# Patient Record
Sex: FEMALE | Race: WHITE | NOT HISPANIC OR LATINO | Employment: STUDENT | URBAN - METROPOLITAN AREA
[De-identification: names, ages, dates, MRNs, and addresses within clinical notes are randomized per-mention and may not be internally consistent; named-entity substitution may affect disease eponyms.]

---

## 2023-04-04 ENCOUNTER — APPOINTMENT (OUTPATIENT)
Dept: PRIMARY CARE | Facility: CLINIC | Age: 18
End: 2023-04-04
Payer: COMMERCIAL

## 2023-04-04 PROBLEM — L72.9 INFECTED CYST OF SKIN: Status: ACTIVE | Noted: 2023-04-04
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
## 2023-04-04 PROBLEM — L08.9 INFECTED CYST OF SKIN: Status: ACTIVE | Noted: 2023-04-04

## 2023-04-04 PROBLEM — D17.20 LIPOMA OF THIGH: Status: ACTIVE | Noted: 2023-04-04

## 2023-04-04 PROBLEM — S93.601A FOOT SPRAIN, RIGHT, INITIAL ENCOUNTER: Status: ACTIVE | Noted: 2023-04-04

## 2023-04-05 ENCOUNTER — APPOINTMENT (OUTPATIENT)
Dept: PRIMARY CARE | Facility: CLINIC | Age: 18
End: 2023-04-05
Payer: COMMERCIAL

## 2025-02-16 ENCOUNTER — APPOINTMENT (OUTPATIENT)
Dept: RADIOLOGY | Facility: HOSPITAL | Age: 20
End: 2025-02-16
Payer: COMMERCIAL

## 2025-02-16 ENCOUNTER — HOSPITAL ENCOUNTER (EMERGENCY)
Facility: HOSPITAL | Age: 20
Discharge: HOME | End: 2025-02-17
Payer: COMMERCIAL

## 2025-02-16 DIAGNOSIS — R10.9 ABDOMINAL PAIN, UNSPECIFIED ABDOMINAL LOCATION: Primary | ICD-10-CM

## 2025-02-16 LAB
ALBUMIN SERPL BCP-MCNC: 4.5 G/DL (ref 3.4–5)
ALP SERPL-CCNC: 44 U/L (ref 33–110)
ALT SERPL W P-5'-P-CCNC: 17 U/L (ref 7–45)
ANION GAP SERPL CALC-SCNC: 11 MMOL/L (ref 10–20)
APPEARANCE UR: ABNORMAL
AST SERPL W P-5'-P-CCNC: 17 U/L (ref 9–39)
B-HCG SERPL-ACNC: <2 MIU/ML
BASOPHILS # BLD AUTO: 0.06 X10*3/UL (ref 0–0.1)
BASOPHILS NFR BLD AUTO: 0.6 %
BILIRUB SERPL-MCNC: 0.3 MG/DL (ref 0–1.2)
BILIRUB UR STRIP.AUTO-MCNC: NEGATIVE MG/DL
BUN SERPL-MCNC: 16 MG/DL (ref 6–23)
CALCIUM SERPL-MCNC: 9.1 MG/DL (ref 8.6–10.3)
CHLORIDE SERPL-SCNC: 103 MMOL/L (ref 98–107)
CO2 SERPL-SCNC: 27 MMOL/L (ref 21–32)
COLOR UR: ABNORMAL
CREAT SERPL-MCNC: 0.78 MG/DL (ref 0.5–1.05)
EGFRCR SERPLBLD CKD-EPI 2021: >90 ML/MIN/1.73M*2
EOSINOPHIL # BLD AUTO: 0.36 X10*3/UL (ref 0–0.7)
EOSINOPHIL NFR BLD AUTO: 3.7 %
ERYTHROCYTE [DISTWIDTH] IN BLOOD BY AUTOMATED COUNT: 13.2 % (ref 11.5–14.5)
GLUCOSE SERPL-MCNC: 88 MG/DL (ref 74–99)
GLUCOSE UR STRIP.AUTO-MCNC: NORMAL MG/DL
HCT VFR BLD AUTO: 45 % (ref 36–46)
HGB BLD-MCNC: 15.1 G/DL (ref 12–16)
IMM GRANULOCYTES # BLD AUTO: 0.03 X10*3/UL (ref 0–0.7)
IMM GRANULOCYTES NFR BLD AUTO: 0.3 % (ref 0–0.9)
KETONES UR STRIP.AUTO-MCNC: NEGATIVE MG/DL
LACTATE SERPL-SCNC: 1 MMOL/L (ref 0.4–2)
LEUKOCYTE ESTERASE UR QL STRIP.AUTO: NEGATIVE
LIPASE SERPL-CCNC: 34 U/L (ref 9–82)
LYMPHOCYTES # BLD AUTO: 2.78 X10*3/UL (ref 1.2–4.8)
LYMPHOCYTES NFR BLD AUTO: 28.6 %
MCH RBC QN AUTO: 29.9 PG (ref 26–34)
MCHC RBC AUTO-ENTMCNC: 33.6 G/DL (ref 32–36)
MCV RBC AUTO: 89 FL (ref 80–100)
MONOCYTES # BLD AUTO: 1 X10*3/UL (ref 0.1–1)
MONOCYTES NFR BLD AUTO: 10.3 %
NEUTROPHILS # BLD AUTO: 5.48 X10*3/UL (ref 1.2–7.7)
NEUTROPHILS NFR BLD AUTO: 56.5 %
NITRITE UR QL STRIP.AUTO: NEGATIVE
NRBC BLD-RTO: 0 /100 WBCS (ref 0–0)
PH UR STRIP.AUTO: 7 [PH]
PLATELET # BLD AUTO: 259 X10*3/UL (ref 150–450)
POTASSIUM SERPL-SCNC: 4 MMOL/L (ref 3.5–5.3)
PROT SERPL-MCNC: 7.1 G/DL (ref 6.4–8.2)
PROT UR STRIP.AUTO-MCNC: NEGATIVE MG/DL
RBC # BLD AUTO: 5.05 X10*6/UL (ref 4–5.2)
RBC # UR STRIP.AUTO: NEGATIVE MG/DL
SODIUM SERPL-SCNC: 137 MMOL/L (ref 136–145)
SP GR UR STRIP.AUTO: 1.02
UROBILINOGEN UR STRIP.AUTO-MCNC: ABNORMAL MG/DL
WBC # BLD AUTO: 9.7 X10*3/UL (ref 4.4–11.3)

## 2025-02-16 PROCEDURE — 83605 ASSAY OF LACTIC ACID: CPT | Performed by: STUDENT IN AN ORGANIZED HEALTH CARE EDUCATION/TRAINING PROGRAM

## 2025-02-16 PROCEDURE — 36415 COLL VENOUS BLD VENIPUNCTURE: CPT | Performed by: STUDENT IN AN ORGANIZED HEALTH CARE EDUCATION/TRAINING PROGRAM

## 2025-02-16 PROCEDURE — 85025 COMPLETE CBC W/AUTO DIFF WBC: CPT | Performed by: STUDENT IN AN ORGANIZED HEALTH CARE EDUCATION/TRAINING PROGRAM

## 2025-02-16 PROCEDURE — 99285 EMERGENCY DEPT VISIT HI MDM: CPT | Mod: 25

## 2025-02-16 PROCEDURE — 2550000001 HC RX 255 CONTRASTS: Performed by: PHYSICIAN ASSISTANT

## 2025-02-16 PROCEDURE — 74177 CT ABD & PELVIS W/CONTRAST: CPT | Mod: FOREIGN READ | Performed by: RADIOLOGY

## 2025-02-16 PROCEDURE — 96374 THER/PROPH/DIAG INJ IV PUSH: CPT

## 2025-02-16 PROCEDURE — 83690 ASSAY OF LIPASE: CPT | Performed by: STUDENT IN AN ORGANIZED HEALTH CARE EDUCATION/TRAINING PROGRAM

## 2025-02-16 PROCEDURE — 74177 CT ABD & PELVIS W/CONTRAST: CPT

## 2025-02-16 PROCEDURE — 80053 COMPREHEN METABOLIC PANEL: CPT | Performed by: STUDENT IN AN ORGANIZED HEALTH CARE EDUCATION/TRAINING PROGRAM

## 2025-02-16 PROCEDURE — 84702 CHORIONIC GONADOTROPIN TEST: CPT | Performed by: PHYSICIAN ASSISTANT

## 2025-02-16 PROCEDURE — 81003 URINALYSIS AUTO W/O SCOPE: CPT | Performed by: STUDENT IN AN ORGANIZED HEALTH CARE EDUCATION/TRAINING PROGRAM

## 2025-02-16 PROCEDURE — 2500000004 HC RX 250 GENERAL PHARMACY W/ HCPCS (ALT 636 FOR OP/ED): Performed by: PHYSICIAN ASSISTANT

## 2025-02-16 PROCEDURE — 96375 TX/PRO/DX INJ NEW DRUG ADDON: CPT

## 2025-02-16 RX ORDER — MORPHINE SULFATE 4 MG/ML
4 INJECTION INTRAVENOUS ONCE
Status: COMPLETED | OUTPATIENT
Start: 2025-02-16 | End: 2025-02-16

## 2025-02-16 RX ORDER — ONDANSETRON HYDROCHLORIDE 2 MG/ML
4 INJECTION, SOLUTION INTRAVENOUS ONCE
Status: COMPLETED | OUTPATIENT
Start: 2025-02-16 | End: 2025-02-16

## 2025-02-16 RX ADMIN — ONDANSETRON 4 MG: 2 INJECTION INTRAMUSCULAR; INTRAVENOUS at 23:56

## 2025-02-16 RX ADMIN — MORPHINE SULFATE 4 MG: 4 INJECTION, SOLUTION INTRAMUSCULAR; INTRAVENOUS at 23:56

## 2025-02-16 RX ADMIN — IOHEXOL 75 ML: 350 INJECTION, SOLUTION INTRAVENOUS at 23:35

## 2025-02-16 ASSESSMENT — PAIN DESCRIPTION - ORIENTATION
ORIENTATION: LOWER;RIGHT
ORIENTATION_2: RIGHT;MID

## 2025-02-16 ASSESSMENT — PAIN DESCRIPTION - PAIN TYPE: TYPE: ACUTE PAIN

## 2025-02-16 ASSESSMENT — PAIN SCALES - GENERAL
PAINLEVEL_OUTOF10: 8

## 2025-02-16 ASSESSMENT — COLUMBIA-SUICIDE SEVERITY RATING SCALE - C-SSRS
1. IN THE PAST MONTH, HAVE YOU WISHED YOU WERE DEAD OR WISHED YOU COULD GO TO SLEEP AND NOT WAKE UP?: NO
2. HAVE YOU ACTUALLY HAD ANY THOUGHTS OF KILLING YOURSELF?: NO

## 2025-02-16 ASSESSMENT — PAIN DESCRIPTION - LOCATION
LOCATION: BACK
LOCATION_2: ABDOMEN

## 2025-02-16 ASSESSMENT — PAIN DESCRIPTION - DESCRIPTORS
DESCRIPTORS: SHARP
DESCRIPTORS_2: SHARP;BURNING

## 2025-02-16 ASSESSMENT — PAIN - FUNCTIONAL ASSESSMENT: PAIN_FUNCTIONAL_ASSESSMENT: 0-10

## 2025-02-16 ASSESSMENT — PAIN DESCRIPTION - FREQUENCY: FREQUENCY: CONSTANT/CONTINUOUS

## 2025-02-17 VITALS
HEIGHT: 64 IN | WEIGHT: 165 LBS | RESPIRATION RATE: 18 BRPM | HEART RATE: 57 BPM | TEMPERATURE: 98.6 F | BODY MASS INDEX: 28.17 KG/M2 | OXYGEN SATURATION: 100 % | DIASTOLIC BLOOD PRESSURE: 81 MMHG | SYSTOLIC BLOOD PRESSURE: 119 MMHG

## 2025-02-17 LAB — HOLD SPECIMEN: NORMAL

## 2025-02-17 PROCEDURE — 96375 TX/PRO/DX INJ NEW DRUG ADDON: CPT

## 2025-02-17 PROCEDURE — 2500000004 HC RX 250 GENERAL PHARMACY W/ HCPCS (ALT 636 FOR OP/ED): Performed by: PHYSICIAN ASSISTANT

## 2025-02-17 RX ORDER — ONDANSETRON 4 MG/1
4 TABLET, FILM COATED ORAL EVERY 6 HOURS
Qty: 12 TABLET | Refills: 0 | Status: SHIPPED | OUTPATIENT
Start: 2025-02-17 | End: 2025-02-20

## 2025-02-17 RX ORDER — DICYCLOMINE HYDROCHLORIDE 20 MG/1
20 TABLET ORAL 2 TIMES DAILY
Qty: 20 TABLET | Refills: 0 | Status: SHIPPED | OUTPATIENT
Start: 2025-02-17 | End: 2025-02-27

## 2025-02-17 RX ORDER — KETOROLAC TROMETHAMINE 30 MG/ML
30 INJECTION, SOLUTION INTRAMUSCULAR; INTRAVENOUS ONCE
Status: COMPLETED | OUTPATIENT
Start: 2025-02-17 | End: 2025-02-17

## 2025-02-17 RX ADMIN — KETOROLAC TROMETHAMINE 30 MG: 30 INJECTION, SOLUTION INTRAMUSCULAR at 00:59

## 2025-02-17 NOTE — ED PROVIDER NOTES
EMERGENCY MEDICINE EVALUATION NOTE    History of Present Illness     Chief Complaint:   Chief Complaint   Patient presents with    Flank Pain    Abdominal Pain     For last 3 weeks pt c/o increasing rt flank and rt side ABD pain   denies other urinary symptoms  no vomiting or diarrhea     Tylenol 1930        HPI: Amalia Ba is a 19 y.o. female presents with a chief complaint of right upper quadrant abdominal pain.  Patient where she has pain in the right upper quadrant that radiates to the right flank.  Reports that she has associated nausea with this.  She denies any significant vomiting with that she does have some nausea.  Reports the pain is worse anytime she eats or drinks anything.  She specifically eats fatty foods and sports.  Patient has a previous abdominal surgeries.  Patient concern for gallbladder as all of her family was about to have her gallbladder removed at an early age.  Patient denies any chance of pregnancy.  Patient has not take anything at home for symptoms other than Tylenol which does not help her pain.  Patient denies any urinary symptoms such as dysuria or frequency.    Previous History     Past Medical History:   Diagnosis Date    Anxiety disorder, unspecified     Anxiety and depression    Post-traumatic stress disorder, unspecified     PTSD (post-traumatic stress disorder)     Past Surgical History:   Procedure Laterality Date    OTHER SURGICAL HISTORY  02/05/2021    Hand surgery     Social History     Tobacco Use    Smoking status: Never    Smokeless tobacco: Never   Vaping Use    Vaping status: Never Used   Substance Use Topics    Alcohol use: Yes    Drug use: Never     Family History   Problem Relation Name Age of Onset    Heart disease Maternal Grandmother      Bone cancer Maternal Grandfather       Allergies   Allergen Reactions    Escitalopram Other     Perioral rash     Current Outpatient Medications   Medication Instructions    dicyclomine (BENTYL) 20 mg, oral, 2 times daily     ondansetron (ZOFRAN) 4 mg, oral, Every 6 hours       Physical Exam     Appearance: Alert, oriented , cooperative,  in no acute distress.      Skin: Intact,  dry skin, no lesions, rash, petechiae or purpura.      Eyes: PERRLA, EOMs intact,  Conjunctiva pink      ENT: Hearing grossly intact. Pharynx clear     Neck: Supple. Trachea at midline.      Pulmonary: Clear bilaterally. No rales, rhonchi or wheezing. No accessory muscle use or stridor.     Cardiac: Normal rate and rhythm without murmur     Abdomen: Soft, active bowel sounds.  Right upper quadrant abdominal pain with slight guarding.     Musculoskeletal: Full range of motion.      Neurological:Cranial nerves II through XII are grossly intact, normal sensation, no weakness, no focal findings identified.     Results     Labs Reviewed   URINALYSIS WITH REFLEX CULTURE AND MICROSCOPIC - Abnormal       Result Value    Color, Urine Light-Yellow      Appearance, Urine Turbid (*)     Specific Gravity, Urine 1.023      pH, Urine 7.0      Protein, Urine NEGATIVE      Glucose, Urine Normal      Blood, Urine NEGATIVE      Ketones, Urine NEGATIVE      Bilirubin, Urine NEGATIVE      Urobilinogen, Urine 2 (1+) (*)     Nitrite, Urine NEGATIVE      Leukocyte Esterase, Urine NEGATIVE     LACTATE - Normal    Lactate 1.0      Narrative:     Venipuncture immediately after or during the administration of Metamizole may lead to falsely low results. Testing should be performed immediately prior to Metamizole dosing.   LIPASE - Normal    Lipase 34      Narrative:     Venipuncture immediately after or during the administration of Metamizole may lead to falsely low results. Testing should be performed immediately prior to Metamizole dosing.   COMPREHENSIVE METABOLIC PANEL - Normal    Glucose 88      Sodium 137      Potassium 4.0      Chloride 103      Bicarbonate 27      Anion Gap 11      Urea Nitrogen 16      Creatinine 0.78      eGFR >90      Calcium 9.1      Albumin 4.5      Alkaline  Phosphatase 44      Total Protein 7.1      AST 17      Bilirubin, Total 0.3      ALT 17     HUMAN CHORIONIC GONADOTROPIN, SERUM QUANTITATIVE - Normal    HCG, Beta-Quantitative <2      Narrative:      Total HCG measurement is performed using the [employer]   Immunoassay which detects intact HCG and free beta HCG subunit.    This test is not indicated for use as a tumor marker.   HCG testing is performed using a different test methodology at [facility] ([facility]) than other [facility]. Direct result comparison   should only be made within the same method.       CBC WITH AUTO DIFFERENTIAL    WBC 9.7      nRBC 0.0      RBC 5.05      Hemoglobin 15.1      Hematocrit 45.0      MCV 89      MCH 29.9      MCHC 33.6      RDW 13.2      Platelets 259      Neutrophils % 56.5      Immature Granulocytes %, Automated 0.3      Lymphocytes % 28.6      Monocytes % 10.3      Eosinophils % 3.7      Basophils % 0.6      Neutrophils Absolute 5.48      Immature Granulocytes Absolute, Automated 0.03      Lymphocytes Absolute 2.78      Monocytes Absolute 1.00      Eosinophils Absolute 0.36      Basophils Absolute 0.06     URINALYSIS WITH REFLEX CULTURE AND MICROSCOPIC    Narrative:     The following orders were created for panel order Urinalysis with Reflex Culture and Microscopic.  Procedure                               Abnormality         Status                     ---------                               -----------         ------                     Urinalysis with Reflex C...[[id number]]  Abnormal            Final result               Extra Urine Gray Tube[[id number]]                            In process                   Please view results for these tests on the individual orders.   EXTRA URINE GRAY TUBE     CT abdomen pelvis w IV contrast   Final Result   1. No CT evidence of acute process in the abdomen or pelvis.    2. Contracted gallbladder without calcified cholelithiasis or   pericholecystic inflammatory  "change.  Right upper quadrant ultrasound   can be obtained for further evaluation, if there is concern for   pathology involving the gallbladder.   Signed by Mc Moran DO            ED Course & Medical Decision Making     Medications   ketorolac (Toradol) injection 30 mg (has no administration in time range)   iohexol (OMNIPaque) 350 mg iodine/mL solution 75 mL (75 mL intravenous Given 2/16/25 2335)   morphine injection 4 mg (4 mg intravenous Given 2/16/25 2356)   ondansetron (Zofran) injection 4 mg (4 mg intravenous Given 2/16/25 2356)     Heart Rate:  [86]   Temperature:  [36.9 °C (98.5 °F)]   Respirations:  [20]   BP: (128)/(90)   Height:  [162.6 cm (5' 4\")]   Weight:  [74.8 kg (165 lb)]   Pulse Ox:  [98 %]    ED Course as of 02/17/25 0054   Sun Feb 16, 2025   2300 Ultrasound not here currently so patient will have CT scan added at this time to evaluate for acute cholecystitis. [CJ]   2349 Patient complaining of worsening pain.  She will be given morphine and Zofran. [CJ]   Mon Feb 17, 2025   0052 Patient seen and evaluated by attending physician at this time.  Patient's workup did not show any significant abnormalities on her blood work.  CBC was within normal limits CMP was within normal limits.  Normal LFTs normal bili.  Lipase was normal at 34.  Negative lactate.  Currently do not have ultrasound capabilities as we do not have a tech covering at nights on the weekends however CT abdomen and pelvis did not show any significant abnormalities it did show contracted gallbladder but no significant gallstones.  Unsure abdominal exam as patient does not have any peritonitis.  Patient will be given 1 more dose of IV Toradol here in the discharged home with nausea meds and Bentyl for home.  She will be given referral to general surgery.  She was encouraged return here immediately with any worsening symptoms.  Patient and mother in agreement with this plan of care. [CJ]      ED Course User Index  [CJ] Dilan Lewis, " RAMEZ         Diagnoses as of 02/17/25 0054   Abdominal pain, unspecified abdominal location       Procedures   Procedures    Diagnosis     1. Abdominal pain, unspecified abdominal location        Disposition   Discharged    ED Prescriptions       Medication Sig Dispense Start Date End Date Auth. Provider    dicyclomine (Bentyl) 20 mg tablet Take 1 tablet (20 mg) by mouth 2 times a day for 10 days. 20 tablet 2/17/2025 2/27/2025 Dilan Lewis PA-C    ondansetron (Zofran) 4 mg tablet Take 1 tablet (4 mg) by mouth every 6 hours for 3 days. 12 tablet 2/17/2025 2/20/2025 Dilan Lewis PA-C            Disclaimer: This note was dictated by speech recognition. Minor errors in transcription may be present. Please call if questions.       Dilan Lewis PA-C  02/17/25 0054

## 2025-02-19 NOTE — ED PROVIDER NOTES
ED attending attestation note    History: Patient is a 19-year-old female presents emergency department for right upper quadrant pain.  Her pain does radiate to the right flank.  Endorses nausea denies vomiting.  Denies fevers, chills, dysuria, chest pain, shortness of breath.    Exam: Resting comfortably.  No acute stress.  Nontoxic.  Heart regular rate and rhythm.  Lungs good auscultation bilaterally.  Abdomen soft, nondistended, nontender.  No flank tenderness.  Alert and oriented x 4.  No gross neurological deficits.    MDM: Patient is a 19-year-old female presents emergency department for right upper quadrant pain.  Differential diagnoses include but are not limited to cholecystitis, kidney stone, UTI, pregnancy, SHIRA, electrolyte abnormalities.  CBC with no leukocytosis or anemia.  CMP with no Clark abnormalities, SHIRA, or transaminitis.  hCG negative.  UA not consistent with UTI.  CT abdomen pelvis with no acute abdominal processes.  There is a contracted gallbladder without calcified cholelithiasis or pericholecystic inflammatory change.  Workup not reveal any acute life or limb threatening processes that require admission to the hospital.  Patient otherwise well-appearing and appropriate for discharge home.     Erin Colvin MD  02/19/25 2517

## 2025-02-25 ENCOUNTER — APPOINTMENT (OUTPATIENT)
Facility: CLINIC | Age: 20
End: 2025-02-25
Payer: COMMERCIAL

## 2025-02-25 VITALS
DIASTOLIC BLOOD PRESSURE: 72 MMHG | SYSTOLIC BLOOD PRESSURE: 98 MMHG | OXYGEN SATURATION: 97 % | HEIGHT: 64 IN | WEIGHT: 182.8 LBS | HEART RATE: 83 BPM | BODY MASS INDEX: 31.21 KG/M2

## 2025-02-25 DIAGNOSIS — R10.9 RIGHT FLANK PAIN: Primary | ICD-10-CM

## 2025-02-25 DIAGNOSIS — R10.11 RIGHT UPPER QUADRANT ABDOMINAL PAIN: ICD-10-CM

## 2025-02-25 DIAGNOSIS — G89.29 CHRONIC ABDOMINAL PAIN: ICD-10-CM

## 2025-02-25 DIAGNOSIS — R10.9 CHRONIC ABDOMINAL PAIN: ICD-10-CM

## 2025-02-25 DIAGNOSIS — R10.13 EPIGASTRIC PAIN: ICD-10-CM

## 2025-02-25 DIAGNOSIS — K59.09 CHRONIC CONSTIPATION: ICD-10-CM

## 2025-02-25 PROCEDURE — 1036F TOBACCO NON-USER: CPT | Performed by: SURGERY

## 2025-02-25 PROCEDURE — 3008F BODY MASS INDEX DOCD: CPT | Performed by: SURGERY

## 2025-02-25 PROCEDURE — 99203 OFFICE O/P NEW LOW 30 MIN: CPT | Performed by: SURGERY

## 2025-02-25 RX ORDER — PANTOPRAZOLE SODIUM 40 MG/1
40 TABLET, DELAYED RELEASE ORAL
Qty: 30 TABLET | Refills: 0 | Status: SHIPPED | OUTPATIENT
Start: 2025-02-25 | End: 2025-03-27

## 2025-02-25 ASSESSMENT — ENCOUNTER SYMPTOMS
WOUND: 0
CONFUSION: 0
SPEECH DIFFICULTY: 0
AGITATION: 0
DIARRHEA: 0
DYSURIA: 0
FLANK PAIN: 0
FATIGUE: 0
VOMITING: 0
SHORTNESS OF BREATH: 0
HEADACHES: 0
MYALGIAS: 0
BRUISES/BLEEDS EASILY: 0
CONSTIPATION: 1
HEMATURIA: 0
NAUSEA: 0
ABDOMINAL PAIN: 1
ARTHRALGIAS: 0
COUGH: 0
WEAKNESS: 0
CHILLS: 0
EYE REDNESS: 0
EYE PAIN: 0
POLYPHAGIA: 0
FEVER: 0
FREQUENCY: 0

## 2025-02-25 NOTE — PROGRESS NOTES
GENERAL SURGERY OFFICE NOTE    Patient: Amalia Ba    Age: 19 y.o.   Gender: female    MRN: [id number]    PCP: No Assigned PCP Generic Provider, MD        SUBJECTIVE     Chief Complaint  New Patient Visit (Patient was referred bu [facility] ED for abdominal pain. Patient states that the pain is more on the right side under her ribs. Patient states that the pain started about 3 weeks ago and has been constant. Patient states that she has had constipation that started at the same time as the pain. Patient states that the pain gets worse with eating. )       ABELARDO Holland is a 19-year-old white female who I am seeing as a referral from the emergency room for evaluation of right flank pain.  She actually has chronic abdominal pain over the last 5 years.  She states that she has had chronic constipation since she was a toddler, and was actually admitted to the hospital when she was 1-years-old due to the constipation.  She does not follow with GI long-term.  5 years ago she was having some left upper quadrant abdominal pain and had a normal CT.  4 years ago (2021) she was having both left upper quadrant and left lower quadrant abdominal pain with a negative CT.  In 2023 she underwent a right upper quadrant ultrasound which did not identify any gallstones.  She recently went to the emergency room with a 2 to 3-week history of right flank pain that radiated into her right lower back.  She had no associated nausea or vomiting or fevers at that time.  Laboratory evaluation was all within normal limits.  CT evaluation showed no underlying abnormality.  She was given Bentyl which she has been taking every morning which seems to be helping with the pain.  She has noticed that eating fatty foods and red meat sometimes makes the pain worse.  She has been avoiding these foods, and feels that the pain is better.  She has tried Pepto-Bismol and Tums over-the-counter without any improvement.  Despite the chronicity of her abdominal  "pain, she has actually gained weight.  Without a PCP, she was referred to general surgery for further evaluation of her \"abdominal pain\".    ROS  Review of Systems   Constitutional:  Negative for chills, fatigue and fever.   HENT:  Negative for congestion, ear pain and hearing loss.    Eyes:  Negative for pain and redness.   Respiratory:  Negative for cough and shortness of breath.    Cardiovascular:  Negative for chest pain and leg swelling.   Gastrointestinal:  Positive for abdominal pain and constipation. Negative for diarrhea, nausea and vomiting.   Endocrine: Negative for polyphagia.   Genitourinary:  Negative for dysuria, flank pain, frequency and hematuria.   Musculoskeletal:  Negative for arthralgias and myalgias.   Skin:  Negative for rash and wound.   Allergic/Immunologic: Negative for immunocompromised state.   Neurological:  Negative for speech difficulty, weakness and headaches.   Hematological:  Does not bruise/bleed easily.   Psychiatric/Behavioral:  Negative for agitation and confusion.           HISTORY     Past Medical History:   Diagnosis Date    Anxiety disorder, unspecified     Anxiety and depression    Post-traumatic stress disorder, unspecified     PTSD (post-traumatic stress disorder)        Past Surgical History:   Procedure Laterality Date    OTHER SURGICAL HISTORY  02/05/2021    Hand surgery        Family History   Problem Relation Name Age of Onset    Heart disease Maternal Grandmother      Bone cancer Maternal Grandfather          Allergies   Allergen Reactions    Escitalopram Other     Perioral rash        Social History     Tobacco Use   Smoking Status Never   Smokeless Tobacco Never        Social History     Substance and Sexual Activity   Alcohol Use Yes        HOME MEDICATIONS  Current Outpatient Medications   Medication Instructions    dicyclomine (BENTYL) 20 mg, oral, 2 times daily    pantoprazole (PROTONIX) 40 mg, oral, Daily before breakfast, Do not crush, chew, or split.    " "sertraline HCl (ZOLOFT ORAL) 150 mg, oral          OBJECTIVE   Last Recorded Vitals.  Blood pressure 98/72, pulse 83, height 1.626 m (5' 4\"), weight 82.9 kg (182 lb 12.8 oz), last menstrual period 02/15/2025, SpO2 97%.     PHYSICAL EXAM  Physical Exam   General: Well-developed, well-nourished and in no acute distress.  Head: Normocephalic. Atraumatic.  Neck/thyroid: Neck is supple.   Eyes: Pupils equal round and reactive to light. Conjunctiva normal.  ENMT: No masses or deformity of external nose. External ears without masses.  Respiratory/Chest:  Normal respiratory effort.  Cardiovascular: Regular rate and rhythm.   Abdomen: Soft, nontender, nondistended.  No point tenderness with distraction.  No peritoneal signs.  Musculoskeletal: Joints and limbs are grossly normal. Normal gait. Normal range of motion of major joints.  Neuro: Oriented to person, place and time. No obvious neurological deficit. Motor strength grossly normal.  Psych: Normal mood and affect.    RESULTS   LABS  CBC and Auto Differential  Order: [id number]   Status: Final result       Visible to patient: Yes (not seen)    0 Result Notes        Component  Ref Range & Units 9 d ago 3 yr ago 5 yr ago   WBC  4.4 - 11.3 x10*3/uL 9.7 7.9 R 9.9 R   nRBC  0.0 - 0.0 /100 WBCs 0.0     RBC  4.00 - 5.20 x10*6/uL 5.05 5.29 High  R 4.91 R   Hemoglobin  12.0 - 16.0 g/dL 15.1 15.8 14.3   Hematocrit  36.0 - 46.0 % 45.0 47.3 High  42.1   MCV  80 - 100 fL 89 89 R 86 R   MCH  26.0 - 34.0 pg 29.9     MCHC  32.0 - 36.0 g/dL 33.6 33.4 R 34.0 R   RDW  11.5 - 14.5 % 13.2 12.7 12.7   Platelets  150 - 450 x10*3/uL 259 252 R 209 R        Comprehensive Metabolic Panel  Order: [id number]   Status: Final result       Visible to patient: Yes (seen)    0 Result Notes         Component  Ref Range & Units 9 d ago  (2/16/25) 3 yr ago  (10/6/21) 3 yr ago  (4/3/21) 5 yr ago  (2/21/20)   Glucose  74 - 99 mg/dL 88 92 84 81   Sodium  136 - 145 mmol/L 137 136 139 136   Potassium  3.5 - 5.3 " mmol/L 4.0 4.1 4.0 4.0 CM   Chloride  98 - 107 mmol/L 103 105 106 105   Bicarbonate  21 - 32 mmol/L 27 26 R 27 R 26 R   Anion Gap  10 - 20 mmol/L 11 9 Low  R 10 R 9 Low  R   Urea Nitrogen  6 - 23 mg/dL 16 10 11 10   Creatinine  0.50 - 1.05 mg/dL 0.78 0.79 R 0.77 R 0.75 R   eGFR  >60 mL/min/1.73m*2 >90      Comment: Calculations of estimated GFR are performed using the 2021 CKD-EPI Study Refit equation without the race variable for the IDMS-Traceable creatinine methods.  https://jasn.asnjournals.org/content/early/2021/09/22/ASN.1899743201   Calcium  8.6 - 10.3 mg/dL 9.1 9.2 R 9.5 R 9.1 R   Albumin  3.4 - 5.0 g/dL 4.5 4.4     Alkaline Phosphatase  33 - 110 U/L 44 63 R     Total Protein  6.4 - 8.2 g/dL 7.1 7.0 R     AST  9 - 39 U/L 17 15 R     Bilirubin, Total  0.0 - 1.2 mg/dL 0.3 0.5 R     ALT  7 - 45 U/L 17 12 R, CM     Comment: Patients treated with Sulfasalazine may generate falsely decreased results for ALT.   Resulting Agency [facility]          RADIOLOGY RESULTS    [facility]  Outside Information  Results  US abdomen complete (Order [id number])     US abdomen complete  Order: [id number]  Impression    IMPRESSION:  Normal abdominal ultrasound findings.      **This report has been created using voice recognition software**  Narrative    CLINICAL HISTORY: rule out gallbladder disease    TECHNIQUE: Sonographic evaluation of the abdomen was performed.    COMPARISON: None.    FINDINGS:  LIVER: Normal.    GALLBLADDER: Normal.    CBD: Normal.  CBD diameter: 4 mm.    PANCREAS: Visualized portions appear normal.    KIDNEYS: Normal.  Right kidney length: 10.2 cm x 5.2 cm.  Left kidney length: 11.9 cm x 4.3 cm.    SPLEEN: Normal.  Spleen length: 11 cm.    AORTA / IVC: Visualized portions are patent.    URINARY BLADDER: Normal.  Exam End: 03/30/23 09:32    Specimen Collected: 03/30/23 08:20 Last Resulted: 03/30/23 10:25   Received From: [facility]   Result Received: 02/16/25 20:05     CT abdomen pelvis w IV contrast  Status: Final result     PACS Images     Show images for CT abdomen pelvis w IV contrast  Signed by    Signed Time Phone Pager   Mc Moran DO 2/16/2025 23:58 [phone]      Exam Information    Status Exam Begun Exam Ended   Final 2/16/2025 23:12 2/16/2025 23:43     Study Result    Narrative & Impression   STUDY:  CT Abdomen and Pelvis with IV Contrast; 2/16/2025 at 11:43 PM.  INDICATION:  Right upper quadrant abdominal pain.  COMPARISON:  CT AP 4/3/2021.  ACCESSION NUMBER(S):  [id number]  ORDERING CLINICIAN:  SUMEET GOMEZ  TECHNIQUE:  CT of the abdomen and pelvis was performed.  Contiguous axial images  were obtained at 3 mm slice thickness through the abdomen and pelvis.   Coronal and sagittal reconstructions at 3 mm slice thickness were  performed.  Omnipaque 350 75 mL was administered intravenously.    FINDINGS:  LOWER CHEST:  No cardiomegaly.  No pericardial effusion.  Lung bases are clear.     ABDOMEN:     LIVER:  No hepatomegaly.  Smooth surface contour.  Normal attenuation.     BILE DUCTS:  No intrahepatic or extrahepatic biliary ductal dilatation.     GALLBLADDER:  The gallbladder is contracted without calcified cholelithiasis. No  pericholecystic inflammatory changes noted.  STOMACH:  No abnormalities identified.     PANCREAS:  No masses or ductal dilatation.     SPLEEN:  No splenomegaly or focal splenic lesion.     ADRENAL GLANDS:  No thickening or nodules.     KIDNEYS AND URETERS:  Kidneys are normal in size and location.  No renal or ureteral  calculi.     PELVIS:     BLADDER:  No abnormalities identified.     REPRODUCTIVE ORGANS:  No abnormalities identified. Bilateral ovarian follicles. Tampon in  place.     BOWEL:  No evidence of bowel obstruction. Small to moderate amount stool  throughout the colon. No substantial bowel wall thickening.  Unremarkable appendix.     VESSELS:  No abnormalities identified.  Abdominal aorta is  normal in caliber.      PERITONEUM/RETROPERITONEUM/LYMPH NODES:  No free fluid.  No pneumoperitoneum.  No lymphadenopathy.     ABDOMINAL WALL:  No abnormalities identified.  SOFT TISSUES:   No abnormalities identified.     BONES:  No acute fracture or aggressive osseous lesion.  IMPRESSION:  1. No CT evidence of acute process in the abdomen or pelvis.   2. Contracted gallbladder without calcified cholelithiasis or  pericholecystic inflammatory change.  Right upper quadrant ultrasound  can be obtained for further evaluation, if there is concern for  pathology involving the gallbladder.  Signed by Mc Moran DO       PATHOLOGY      The above labs, radiology films and pathology reports were reviewed by myself.   Referring physician notes and other providers notes reviewed.      ASSESSMENT / PLAN   Diagnoses and all orders for this visit:  Right flank pain  -     Referral to General Surgery  -     NM hepatobiliary w cholecystokinin; Future  Chronic abdominal pain  Chronic constipation  -     Referral to Gastroenterology; Future  Epigastric pain  -     pantoprazole (ProtoNix) 40 mg EC tablet; Take 1 tablet (40 mg) by mouth once daily in the morning. Take before meals. Do not crush, chew, or split.  Right upper quadrant abdominal pain  -     pantoprazole (ProtoNix) 40 mg EC tablet; Take 1 tablet (40 mg) by mouth once daily in the morning. Take before meals. Do not crush, chew, or split.      Plan  1.  Encouraged the patient to find a primary care physician to help coordinate her care.  2.  Her symptoms are not consistent with biliary etiology.  However, she reports having several family members with similar symptoms who ultimately had her gallbladder removed.  Since she did have an ultrasound of her gallbladder less than 2 years ago without any gallstones, will not repeat the ultraound.  Needs lead, we will schedule for a HIDA scan with CCK injection.  Can call the patient with these results.  3.  Will give a 1 month  trial of PPI medication to see if this helps with her symptoms.  4.  A lot of her symptoms seem musculoskeletal in nature.  She does feel that the right flank pain worsens with activity or bending, and the Bentyl seems to help with the.  She is encouraged to use alternating ice and heat to the area.  Would stick with Tylenol based medication as ibuprofen can be irritating to the stomach.  5.  A lot of her chronic abdominal pain can be related to her chronic constipation.  Refer to GI for further evaluation and treatment.  6.  Low grease, low-fat some of her symptoms.  She is also encouraged to eat a high-fiber diet to help with her chronic constipation.      Jessica Angelo MD, FACS  [facility] General Surgery  [facility]. [facility];   [address]  [place]  [address] [phone]

## 2025-02-25 NOTE — PATIENT INSTRUCTIONS
1.  A systemic medication has been sent to your pharmacy.  Protonix is taken once a day to help reduce the acid in your stomach.  Take this every day for the next month to see if this helps with your symptoms.  Return to Dr. Angelo's office after 4 weeks.  2.  A HIDA scan has been ordered to better evaluate the function of your gallbladder.  Dr. Angelo can call you with these results.  3.  You have been referred to a gastroenterologist (GI physician) for discussion of your chronic constipation.  4.  Need to find a primary care physician.

## 2025-03-03 ENCOUNTER — HOSPITAL ENCOUNTER (OUTPATIENT)
Dept: RADIOLOGY | Facility: HOSPITAL | Age: 20
Discharge: HOME | End: 2025-03-03
Payer: COMMERCIAL

## 2025-03-03 ENCOUNTER — TELEPHONE (OUTPATIENT)
Facility: CLINIC | Age: 20
End: 2025-03-03

## 2025-03-03 DIAGNOSIS — R10.9 RIGHT FLANK PAIN: ICD-10-CM

## 2025-03-03 PROCEDURE — 2500000004 HC RX 250 GENERAL PHARMACY W/ HCPCS (ALT 636 FOR OP/ED): Performed by: SURGERY

## 2025-03-03 PROCEDURE — 78227 HEPATOBIL SYST IMAGE W/DRUG: CPT | Performed by: NUCLEAR MEDICINE

## 2025-03-03 PROCEDURE — 3430000001 HC RX 343 DIAGNOSTIC RADIOPHARMACEUTICALS: Performed by: NUCLEAR MEDICINE

## 2025-03-03 PROCEDURE — A9537 TC99M MEBROFENIN: HCPCS | Performed by: NUCLEAR MEDICINE

## 2025-03-03 PROCEDURE — 78227 HEPATOBIL SYST IMAGE W/DRUG: CPT

## 2025-03-03 RX ORDER — SINCALIDE 5 UG/5ML
1.5 INJECTION, POWDER, LYOPHILIZED, FOR SOLUTION INTRAVENOUS ONCE
Status: COMPLETED | OUTPATIENT
Start: 2025-03-03 | End: 2025-03-03

## 2025-03-03 RX ORDER — KIT FOR THE PREPARATION OF TECHNETIUM TC 99M MEBROFENIN 45 MG/10ML
5 INJECTION, POWDER, LYOPHILIZED, FOR SOLUTION INTRAVENOUS
Status: COMPLETED | OUTPATIENT
Start: 2025-03-03 | End: 2025-03-03

## 2025-03-03 RX ADMIN — KIT FOR THE PREPARATION OF TECHNETIUM TC 99M MEBROFENIN 5 MILLICURIE: 45 INJECTION, POWDER, LYOPHILIZED, FOR SOLUTION INTRAVENOUS at 08:25

## 2025-03-03 RX ADMIN — SINCALIDE 1.5 MCG: 5 INJECTION, POWDER, LYOPHILIZED, FOR SOLUTION INTRAVENOUS at 09:30

## 2025-03-03 NOTE — TELEPHONE ENCOUNTER
Received faxed referral from Dr. Jorgensen for constipation.  Left a message for patient to schedule a NP OV

## 2025-04-02 ENCOUNTER — APPOINTMENT (OUTPATIENT)
Dept: SURGERY | Facility: CLINIC | Age: 20
End: 2025-04-02
Payer: COMMERCIAL

## 2025-04-11 ENCOUNTER — TELEPHONE (OUTPATIENT)
Dept: PRIMARY CARE CLINIC | Age: 20
End: 2025-04-11

## 2025-04-24 ENCOUNTER — APPOINTMENT (OUTPATIENT)
Facility: CLINIC | Age: 20
End: 2025-04-24
Payer: COMMERCIAL

## 2025-04-24 VITALS
WEIGHT: 185.2 LBS | BODY MASS INDEX: 31.62 KG/M2 | SYSTOLIC BLOOD PRESSURE: 110 MMHG | DIASTOLIC BLOOD PRESSURE: 77 MMHG | OXYGEN SATURATION: 94 % | HEART RATE: 75 BPM | HEIGHT: 64 IN

## 2025-04-24 DIAGNOSIS — K59.09 CHRONIC CONSTIPATION: ICD-10-CM

## 2025-04-24 DIAGNOSIS — R19.8 ALTERNATING CONSTIPATION AND DIARRHEA: Primary | ICD-10-CM

## 2025-04-24 PROCEDURE — 3008F BODY MASS INDEX DOCD: CPT | Performed by: NURSE PRACTITIONER

## 2025-04-24 PROCEDURE — 99204 OFFICE O/P NEW MOD 45 MIN: CPT | Performed by: NURSE PRACTITIONER

## 2025-04-24 PROCEDURE — 1036F TOBACCO NON-USER: CPT | Performed by: NURSE PRACTITIONER

## 2025-04-24 ASSESSMENT — ENCOUNTER SYMPTOMS
ARTHRALGIAS: 0
DIZZINESS: 0
CHILLS: 0
SORE THROAT: 0
SHORTNESS OF BREATH: 0
ADENOPATHY: 0
COUGH: 0
BRUISES/BLEEDS EASILY: 0
WOUND: 0
TROUBLE SWALLOWING: 0
DIFFICULTY URINATING: 0
JOINT SWELLING: 0
FEVER: 0
ROS GI COMMENTS: SEE HPI
PALPITATIONS: 0
CONFUSION: 0
WEAKNESS: 0

## 2025-04-24 NOTE — PATIENT INSTRUCTIONS
Thank you for coming to your appointment tomorrow  - please do your stool studies with [facility]   - keep a stool chart so we can see how often you are having diarrhea vs constipation   - talk to your PCP about your heavy periods, etc, as some GI and some gynecological things can occur together   - I will advise follow up after results     Please call [phone] with any questions or concerns       If you utilize [employer] messages, please understand that these are intended to be used for simple and straightforward medical questions. If you have a more complex question or numerous complaints, an office appointment may be needed.

## 2025-04-24 NOTE — PROGRESS NOTES
Subjective   Patient ID: Amalia Ba is a 19 y.o. female with PMH of PTSD, anxiety, chronic constipation who was referred by Jessica Angelo MD for New Patient Visit (Referred for constipation.  Symptoms have been chronic./Colon 3 years ago at [facility]).     Patient's PCP is Chris Erazo MD    HPI  Patient was in ER 2/16/25 for RUQ and right flank pain. Imaging showed concern of gallbladder etiology. She saw general surgery (Dr. Angelo) and had a HIDA scan that was normal. She was referred to GI for chronic constipation.     Patient reports constipation since she was a small child. She was hospitalized at 1 years old for constipation. She reports being about to go up to 2 weeks without a BM at times. However, she recently has been experiencing diarrhea. She can have 6 BMs in a day. She has N/V especially if she overeats, and has constant bloating. She has gained 40lbs unintentionally in 6 months and is getting lab work ordered by PCP for this tomorrow. Apparently, there are some concerns of possible PCOS vs endometriosis as well which she will be talking to PCP about. She reports very heavy periods. She used to be on birth control (nexplanon) for this but has not been on it in a while. She can get diffuse abdominal pain that can be quite severe at times.     She denies dysphagia, melena, or hematochezia.     She saw [facility] in 2021 for her symptoms. EGD and colonoscopy were normal at that time. She was tested for celiac and CSID which were negative.     She has been on miralax, senna, probiotics, and hyoscyamine.       Summary of endoscopies:  - EGD 10/2021: normal esophagus, normal stomach, normal duodenum. Duodenal biopsies negative for disaccharidase deficiency. Normal villous structure and normal lymphocytes. Gastric mucosa showed no H.pylori. Esophageal biopsies negative for EoE.   - Colonoscopy 10/2021: normal colon, cecum, and TI. Biopsies showed normal colon mucosa; no cryptitis,  crypt abscess, or granulomas.     Social Hx:  Tobacco: none   Etoh: occasional   Recreational drug use: none   NSAIDs: none       Family Hx:  No GI malignancy, IBD, or pancreatitis     Review of Systems:  Review of Systems   Constitutional:  Negative for chills and fever.   HENT:  Negative for sore throat and trouble swallowing.    Respiratory:  Negative for cough and shortness of breath.    Cardiovascular:  Negative for chest pain and palpitations.   Gastrointestinal:         SEE HPI   Endocrine: Negative for cold intolerance and heat intolerance.   Genitourinary:  Negative for difficulty urinating.   Musculoskeletal:  Negative for arthralgias and joint swelling.   Skin:  Negative for rash and wound.   Neurological:  Negative for dizziness and weakness.   Hematological:  Negative for adenopathy. Does not bruise/bleed easily.   Psychiatric/Behavioral:  Negative for confusion.         Medications:  Prior to Admission medications    Medication Sig Start Date End Date Taking? Authorizing Provider   sertraline HCl (ZOLOFT ORAL) Take 150 mg by mouth.  Patient taking differently: Take 200 mg by mouth.   Yes Historical Provider, MD   pantoprazole (ProtoNix) 40 mg EC tablet Take 1 tablet (40 mg) by mouth once daily in the morning. Take before meals. Do not crush, chew, or split.  Patient not taking: Reported on 4/24/2025 2/25/25 3/27/25  Jessica Angelo MD       Allergies:  Escitalopram    Past Medical History:  She has a past medical history of Anxiety disorder, unspecified and Post-traumatic stress disorder, unspecified.    Past Surgical History:  She has a past surgical history that includes Other surgical history (02/05/2021).    Social History:  She reports that she has never smoked. She has never used smokeless tobacco. She reports current alcohol use. She reports that she does not use drugs.    Objective   Physical exam:  Physical Exam  Constitutional:       General: She is not in acute distress.     Appearance:  Normal appearance.   HENT:      Mouth/Throat:      Mouth: Mucous membranes are moist.      Comments: pink  Eyes:      Conjunctiva/sclera: Conjunctivae normal.      Pupils: Pupils are equal, round, and reactive to light.   Cardiovascular:      Rate and Rhythm: Normal rate and regular rhythm.      Heart sounds: No murmur heard.  Pulmonary:      Effort: Pulmonary effort is normal.      Breath sounds: Normal breath sounds.   Abdominal:      General: Bowel sounds are normal. There is no distension.      Palpations: Abdomen is soft.      Tenderness: There is no abdominal tenderness. There is no guarding.   Skin:     General: Skin is warm and dry.      Coloration: Skin is not jaundiced.   Neurological:      Mental Status: She is alert and oriented to person, place, and time.   Psychiatric:         Mood and Affect: Mood normal.         Behavior: Behavior normal.          Assessment/Plan     Abdominal pain, diarrhea, history of constipation  Patient recently having abdominal pain and right flank pain and more diarrhea than normal. She previously had constipation. Will check calprotectin and pancreatic elastase. EGD previously negative for celiac.     Heavy periods, abdominal pain   Advised she discuss with PCP        Lou Ryder, STEPH-CNP